# Patient Record
(demographics unavailable — no encounter records)

---

## 2024-11-04 NOTE — PHYSICAL EXAM
[Right] : right shoulder [Sitting] : sitting [Mild] : mild [5 ___] : forward flexion 5[unfilled]/5 [5___] : external rotation 5[unfilled]/5 [Left] : left shoulder [] : negative Orozco [FreeTextEntry8] : This is sore. [FreeTextEntry9] : IR to T12. [de-identified] : Bellypress is positive.  [de-identified] : -arc [TWNoteComboBox4] : passive forward flexion 160 degrees [TWNoteComboBox6] : internal rotation L1 [de-identified] : external rotation 60 degrees

## 2024-11-04 NOTE — REASON FOR VISIT
[FreeTextEntry2] : WC DOI 2/15/24 This is a 56 year old RHD M () with right shoulder pain that occurred after a fall at work on 2/15/24. He tried to break his fall and then landed directly on the shoulder.  He went to [facility] where xrays were done.  No n/t.  No prior shoulder injuries.  he 4/22/24 SA injection helped.  He sleeps OK.  There is soreness with reaching.  PT has been helpful.  There is more mobility and less pain, overall.

## 2024-11-04 NOTE — DATA REVIEWED
[FreeTextEntry1] : MRI R  [facility] 3/29/24: There is no major cuff tear.  Biceps fluid with limited subluxation is suggested.  The muscle is good.  There are slight C and labral changes.

## 2024-11-04 NOTE — ASSESSMENT
[FreeTextEntry1] : PT will continue. He is doing decently well. Questions answered. He is WFD.  Patient seen by Dr. Reyes Schultz, who determined the assessment and plan. Lilia WOODS was present for and participated in aspects of the office encounter.

## 2024-11-04 NOTE — HISTORY OF PRESENT ILLNESS
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

## 2024-12-16 NOTE — HISTORY OF PRESENT ILLNESS
[Work related] : work related [4] : 4 [2] : 2 [Full time] : Work status: full time [de-identified] : Patient is here today for follow up of his right shoulder. Pain has improved since last visit. Has not been able to attend PT since last visit but wants to go. He's doing at home PT exercises in the meantime. Denies n/t. ROM has improved. Pain is described as discomfort.  DOI: 02/13/2024 Patient is back at work full-time performing full duty tasks. Borderline diabetic. A1c unknown. 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

## 2024-12-16 NOTE — PHYSICAL EXAM
[Right] : right shoulder [Sitting] : sitting [Mild] : mild [5 ___] : forward flexion 5[unfilled]/5 [5___] : external rotation 5[unfilled]/5 [Left] : left shoulder [Moderate] : moderate [] : no sensory deficits [FreeTextEntry8] : This is sore. [FreeTextEntry9] : IR to T12. [de-identified] : Bellypress is positive.  [de-identified] : -arc [TWNoteComboBox4] : passive forward flexion 160 degrees [de-identified] : external rotation 60 degrees [TWNoteComboBox6] : internal rotation L1

## 2024-12-16 NOTE — ASSESSMENT
[FreeTextEntry1] : PT will continue. Questions answered. He is WFD, and should continue.  Patient seen by Dr. Reyes Schultz, who determined the assessment and plan. Lilia WOODS participated in the care of the patient, including the history and physical exam.

## 2024-12-16 NOTE — REASON FOR VISIT
[FreeTextEntry2] : WC DOI 2/15/24 This is a 56 year old RHD M () with right shoulder pain that occurred after a fall at work on 2/15/24. He tried to break his fall and then landed directly on the shoulder.  He went to [facility] where xrays were done.  No n/t.  No prior shoulder injuries.  he 4/22/24 SA injection helped.  He sleeps OK.  He has been unable to attend PT recently, though he'd like to go again.  Reaching is sore, but better.

## 2025-06-02 NOTE — REASON FOR VISIT
[FreeTextEntry2] : WC DOI 2/15/24 This is a 56 year old RHD M () with right shoulder pain that occurred after a fall at work on 2/15/24. He tried to break his fall and then landed directly on the shoulder.  He went to [facility] where xrays were done.  No n/t.  No prior shoulder injuries.  he 4/22/24 SA injection helped.  He sleeps OK.  He has been unable to attend PT recently, though he'd like to go again.  Reaching is sore, but better. On 6/2/25, he still has discomfort.  He  is WFD.

## 2025-06-02 NOTE — HISTORY OF PRESENT ILLNESS
[5] : 5 [0] : 0 [Full time] : Work status: full time [] : yes [de-identified] : A1c: unknown, believes it's around 7.0 [FreeTextEntry1] : Right shoulder [de-identified] : HEP [de-identified] :

## 2025-06-02 NOTE — REASON FOR VISIT
[FreeTextEntry2] : WC DOI 2/15/24 This is a 56 year old RHD M () with right shoulder pain that occurred after a fall at work on 2/15/24. He tried to break his fall and then landed directly on the shoulder.  He went to [facility] where xrays were done.  No n/t.  No prior shoulder injuries.  he 4/22/24 SA injection helped.  He sleeps OK.  He has been unable to attend PT recently, though he'd like to go again.  Reaching is sore, but better. On 6/2/25, he still has discomfort.  He  is WFD.

## 2025-06-02 NOTE — PHYSICAL EXAM
[Right] : right shoulder [Sitting] : sitting [Mild] : mild [5 ___] : forward flexion 5[unfilled]/5 [5___] : external rotation 5[unfilled]/5 [Left] : left shoulder [Minimal] : minimal [] : no sensory deficits [FreeTextEntry8] : This is sore. [de-identified] : Bellypress is positive.  [de-identified] : -arc [FreeTextEntry9] : IR to T12. [TWNoteComboBox6] : internal rotation L3 [TWNoteComboBox4] : passive forward flexion 160 degrees [de-identified] : external rotation 60 degrees

## 2025-06-02 NOTE — PHYSICAL EXAM
[Right] : right shoulder [Sitting] : sitting [Mild] : mild [5 ___] : forward flexion 5[unfilled]/5 [5___] : external rotation 5[unfilled]/5 [Left] : left shoulder [Minimal] : minimal [] : no sensory deficits [FreeTextEntry8] : This is sore. [de-identified] : Bellypress is positive.  [de-identified] : -arc [FreeTextEntry9] : IR to T12. [TWNoteComboBox6] : internal rotation L3 [TWNoteComboBox4] : passive forward flexion 160 degrees [de-identified] : external rotation 60 degrees

## 2025-06-02 NOTE — DATA REVIEWED
[FreeTextEntry1] : Previously viewed. MRI R [facility] 3/29/24: There is no major cuff tear. Biceps fluid with limited subluxation is suggested. The muscle is good. There are slight AC and labral changes.  X-rays of the right shoulder on 2/26/24 are as follows: Shoulder Comments: The GH joint is intact. There are AC joint changes. Scapula Comments: He has a Type II acromion.

## 2025-06-02 NOTE — ASSESSMENT
[FreeTextEntry1] : . We discussed his course. The patient has reached MMI. Cont HEP reviewed. Medication use discussed. Questions were answered. F/u is at his request.  Patient seen by Dr. Reyes Schultz, who determined the assessment and plan. Lilia WOODS participated in the care of the patient, including the history and physical exam.

## 2025-06-02 NOTE — HISTORY OF PRESENT ILLNESS
[5] : 5 [0] : 0 [Full time] : Work status: full time [] : yes [de-identified] : A1c: unknown, believes it's around 7.0 [FreeTextEntry1] : Right shoulder [de-identified] : HEP [de-identified] :

## 2025-06-02 NOTE — WORK
[FreeTextEntry1] : He is WFD. [Has the patient reached Maximum Medical Improvement? If yes, indicate date___] : Yes, on [unfilled] [Is there permanent partial impairment?] : Yes [Right] : right [FreeTextEntry7] : SHOULDER [FreeTextEntry5] : 22.5% [de-identified] : 7.5% = Loss of internal rotation, 7.5% = Loss of adduction, and 7.5% = Loss of extension.  Repeated objective measures were obtained.

## 2025-06-02 NOTE — WORK
[FreeTextEntry1] : He is WFD. [Has the patient reached Maximum Medical Improvement? If yes, indicate date___] : Yes, on [unfilled] [Is there permanent partial impairment?] : Yes [Right] : right [FreeTextEntry7] : SHOULDER [FreeTextEntry5] : 22.5% [de-identified] : 7.5% = Loss of internal rotation, 7.5% = Loss of adduction, and 7.5% = Loss of extension.  Repeated objective measures were obtained.